# Patient Record
Sex: MALE
[De-identification: names, ages, dates, MRNs, and addresses within clinical notes are randomized per-mention and may not be internally consistent; named-entity substitution may affect disease eponyms.]

---

## 2022-04-27 ENCOUNTER — APPOINTMENT (OUTPATIENT)
Dept: ORTHOPEDIC SURGERY | Facility: CLINIC | Age: 32
End: 2022-04-27
Payer: OTHER GOVERNMENT

## 2022-04-27 VITALS — BODY MASS INDEX: 26.51 KG/M2 | WEIGHT: 200 LBS | HEIGHT: 73 IN

## 2022-04-27 DIAGNOSIS — Z78.9 OTHER SPECIFIED HEALTH STATUS: ICD-10-CM

## 2022-04-27 DIAGNOSIS — M51.34 OTHER INTERVERTEBRAL DISC DEGENERATION, THORACIC REGION: ICD-10-CM

## 2022-04-27 DIAGNOSIS — F32.A ANXIETY DISORDER, UNSPECIFIED: ICD-10-CM

## 2022-04-27 DIAGNOSIS — F41.9 ANXIETY DISORDER, UNSPECIFIED: ICD-10-CM

## 2022-04-27 PROBLEM — Z00.00 ENCOUNTER FOR PREVENTIVE HEALTH EXAMINATION: Status: ACTIVE | Noted: 2022-04-27

## 2022-04-27 PROCEDURE — 99203 OFFICE O/P NEW LOW 30 MIN: CPT

## 2022-04-27 NOTE — HISTORY OF PRESENT ILLNESS
[Sudden] : sudden [4] : 4 [3] : 3 [Radiating] : radiating [Sharp] : sharp [Stabbing] : stabbing [Tightness] : tightness [Constant] : constant [Household chores] : household chores [Work] : work [Sleep] : sleep [Sitting] : sitting [Student] : Work status: student [de-identified] : 30 y/o male presents for his initial consultation. Patient c/o lower back pain and paresthesias. Patient has underwent PT, and message therapy which he hasn't been finding helpful. Patient reports that this pain started in 2019 after dead lifting. Patient reports that he has been out of the [employer] for 1 year, and hasn't seen any "Civilian" Doctors. Patient was previously living in [facility]. 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

## 2022-04-27 NOTE — DATA REVIEWED
[MRI] : MRI [Thoracic Spine] : thoracic spine [I independently reviewed and interpreted images and report] : I independently reviewed and interpreted images and report [FreeTextEntry1] : Thoracic spine MRI (01/04/2021) - \par REPORT ONLY\par T6-7 Disc bulge, no stenosis\par T5-6 mild DDD

## 2022-04-27 NOTE — PHYSICAL EXAM
[de-identified] : Constitutional:\par -  General Appearance: \par Unremarkable\par Body Habitus\par Well Developed \par Well Nourished\par Body Habitus\par No Deformities\par Well Groomed\par \par Ability To communicate:\par Normal\par \par Neurologic: \par Global sensation is intact to upper and lower extremities.  See examination of Neck and/or Spine for exceptions.\par Orientation to Time, Place and Person is: Normal\par Mood And Affect is Normal\par \par Skin:\par -  Head/Face, Right Upper/Lower Extremity, Left Upper/Lower Extremity: Normal\par See Examination of Neck and/or Spine for exceptions\par \par Cardiovascular:\par Peripheral Cardiovascular System is Normal\par \par Palpation of Lymph Nodes:\par Normal Palpation of lymph nodes in: Axilla, Cervical, Inguinal\par Abnormal Palpation of lymph nodes in: None [] : non-antalgic [FreeTextEntry9] : rotation to the right side causes left thoracic pain [de-identified] : Neurological Testing for the Lumbar spine is as follows:\par - Light Touch is intact throughout both Lower extremities\par - Achilles and patella reflexes are symmetrical\par - No sustained clonus at ankles\par - Negative Babinski test bilaterally\par - Sensory exam is non-focal throughout both lower extremities [de-identified] : Lumbar Spine Special Testing Is as follows:\par - Negative facet loading Bilaterally\par - Negative Straight leg raise bilaterally\par - Negative Reji bilaterally

## 2022-04-27 NOTE — ASSESSMENT
[FreeTextEntry1] : 30 y/o male with mild thoracic disc degeneration and left sided pain, occasional parasthesias approx t7 region. Patient was provided with a referral for thoracolumbar physical therapy to work on stretching, strengthening and range of motion. Patient was provided with a thoracolumbar home exercise program. I encouraged the patient to try alternative treatment such as acupuncture. I advised the patient that OTC NSAIDs may be helpful in reducing his pain. I would like to follow up with the patient on an as needed basis moving forward. \par \par Prior to appointment and during encounter with patient extensive medical records were reviewed including but not limited to, hospital records, out patient records, imaging results, and lab data. During this appointment the patient was examined, diagnoses were discussed and explained in a face to face manner. In addition extensive time was spent reviewing aforementioned diagnostic studies. Counseling including abnormal image results, differential diagnoses, treatment options, risk and benefits, lifestyle changes, current condition, and current medications was performed. Patient's comments, questions, and concerns were address and patient verbalized understanding. Based on this patient's presentation at our office, which is an orthopedic spine surgeon's office, this patient inherently / intrinsically has a risk, however minute, of developing issues such as Cauda equina syndrome, bowel and bladder changes, or progression of motor or neurological deficits such as paralysis which may be permanent.\par \par I, Salty Lezama, attest that this documentation has been prepared under the direction and in the presence of provider River Zhou MD.

## 2022-05-11 ENCOUNTER — APPOINTMENT (OUTPATIENT)
Dept: ORTHOPEDIC SURGERY | Facility: CLINIC | Age: 32
End: 2022-05-11